# Patient Record
Sex: FEMALE | Race: WHITE | Employment: UNEMPLOYED | URBAN - METROPOLITAN AREA
[De-identification: names, ages, dates, MRNs, and addresses within clinical notes are randomized per-mention and may not be internally consistent; named-entity substitution may affect disease eponyms.]

---

## 2024-07-31 ENCOUNTER — HOSPITAL ENCOUNTER (OUTPATIENT)
Age: 29
Discharge: HOME | End: 2024-07-31
Payer: COMMERCIAL

## 2024-07-31 VITALS
OXYGEN SATURATION: 99 % | SYSTOLIC BLOOD PRESSURE: 160 MMHG | TEMPERATURE: 98 F | HEART RATE: 111 BPM | RESPIRATION RATE: 20 BRPM | DIASTOLIC BLOOD PRESSURE: 90 MMHG

## 2024-07-31 DIAGNOSIS — L03.213 PRESEPTAL CELLULITIS OF LEFT UPPER EYELID: Primary | ICD-10-CM

## 2024-07-31 PROCEDURE — 99203 OFFICE O/P NEW LOW 30 MIN: CPT | Performed by: NURSE PRACTITIONER

## 2024-07-31 RX ORDER — ERYTHROMYCIN 5 MG/G
1 OINTMENT OPHTHALMIC EVERY 6 HOURS
Qty: 3.5 G | Refills: 0 | Status: SHIPPED | OUTPATIENT
Start: 2024-07-31 | End: 2024-08-07

## 2024-07-31 RX ORDER — CEFDINIR 300 MG/1
300 CAPSULE ORAL EVERY 12 HOURS
Qty: 14 CAPSULE | Refills: 0 | Status: SHIPPED | OUTPATIENT
Start: 2024-07-31 | End: 2024-08-07

## 2024-07-31 NOTE — ED INITIAL ASSESSMENT (HPI)
Pt complaining of bilateral ear pain/pressure x 1 month, states she has allergies and just assumed it was related to that, yesterday she woke up with left eyelid swollen and painful. Denies vision changes. Pt wears contacts and glasses. No fever, no sick symptoms.

## 2024-07-31 NOTE — ED PROVIDER NOTES
Patient Seen in: [facility]      History     Chief Complaint   Patient presents with    Ear Problem Pain    Eye Visual Problem     Stated Complaint: ear pain, L Eye Pain    Subjective:   27 y/o female with Hashimoto's thyroiditis presents with c/o left upper eyelid pain and swelling onset this morning. States yesterday noticed some redness to the left upper eyelid and thought maybe she was developing a stye.  Used a tea pack to the area last night, which she states usually works for her styes. Endorses that she has had itchy, dry eyes for past month. Related symptoms to her allergies. Wears contacts and glasses. No visual changes, fever/chills, eye drainage             Objective:   No pertinent past medical history.            No pertinent past surgical history.              No pertinent social history.            Review of Systems   Constitutional:  Negative for chills and fever.   HENT:  Negative for congestion.    Eyes:  Positive for pain, discharge and redness. Negative for photophobia, itching and visual disturbance.   Neurological:  Negative for dizziness, light-headedness and headaches.   All other systems reviewed and are negative.      Positive for stated Chief Complaint: Ear Problem Pain and Eye Visual Problem    Other systems are as noted in HPI.  Constitutional and vital signs reviewed.      All other systems reviewed and negative except as noted above.    Physical Exam     ED Triage Vitals [07/31/24 0831]   /90   Pulse 111   Resp 20   Temp 97.6 °F (36.4 °C)   Temp src Temporal   SpO2 99 %   O2 Device None (Room air)       Current Vitals:   Vital Signs  BP: 160/90  Pulse: 111  Resp: 20  Temp: 97.6 °F (36.4 °C)  Temp src: Temporal    Oxygen Therapy  SpO2: 99 %  O2 Device: None (Room air)            Physical Exam  Vitals and nursing note reviewed.   Constitutional:       General: She is not in acute distress.     Appearance: Normal appearance. She is not ill-appearing.   HENT:       Head: Normocephalic.      Mouth/Throat:      Mouth: Mucous membranes are moist.   Eyes:      General: Vision grossly intact.         Left eye: No discharge or hordeolum.      Extraocular Movements: Extraocular movements intact.      Conjunctiva/sclera: Conjunctivae normal.      Pupils: Pupils are equal, round, and reactive to light.      Comments: Left upper eye lid with swelling, erythema. + ttp. No hordeolum visualized   Cardiovascular:      Rate and Rhythm: Normal rate and regular rhythm.   Pulmonary:      Effort: Pulmonary effort is normal.   Musculoskeletal:         General: Normal range of motion.   Skin:     General: Skin is warm and dry.      Capillary Refill: Capillary refill takes less than 2 seconds.   Neurological:      Mental Status: She is alert and oriented to person, place, and time.   Psychiatric:         Behavior: Behavior is cooperative.               ED Course   Labs Reviewed - No data to display  Visual Acuity          Right Eye Visual Acuity: Corrected Right Eye Chart Acuity: 20/20   Left Eye Visual Acuity: Corrected Left Eye Chart Acuity: 20/30                            [facility]                                         Medical Decision Making  Patient is well-appearing.  I discussed differentials with patient including but not limited to allergic response vs hordeolum vs blepharitis vs preseptal cellulitis.  Keep eyelids clean and dry.  Avoid scratching, otc meds prn  RX cefdinir, erythromycin eye ointment  Fu with PCP/Opthalmology. Return/ ED precautions discussed      Problems Addressed:  Preseptal cellulitis of left upper eyelid: acute illness or injury        Disposition and Plan     Clinical Impression:  1. Preseptal cellulitis of left upper eyelid         Disposition:  Discharge  7/31/2024  8:56 am    Follow-up:  [facility] - [address] [address] [phone]              Medications Prescribed:  Discharge Medication List  as of 7/31/2024  8:58 AM        START taking these medications    Details   cefdinir 300 MG Oral Cap Take 1 capsule (300 mg total) by mouth every 12 (twelve) hours for 7 days., Normal, Disp-14 capsule, R-0